# Patient Record
Sex: MALE | Race: WHITE | Employment: UNEMPLOYED | RURAL
[De-identification: names, ages, dates, MRNs, and addresses within clinical notes are randomized per-mention and may not be internally consistent; named-entity substitution may affect disease eponyms.]

---

## 2017-11-20 ENCOUNTER — HOSPITAL ENCOUNTER (OUTPATIENT)
Dept: OTHER | Age: 22
Discharge: HOME | End: 2017-11-20
Attending: FAMILY MEDICINE | Admitting: FAMILY MEDICINE

## 2017-11-20 ENCOUNTER — OFFICE VISIT (OUTPATIENT)
Dept: FAMILY MEDICINE CLINIC | Age: 22
End: 2017-11-20
Payer: COMMERCIAL

## 2017-11-20 VITALS
BODY MASS INDEX: 31.22 KG/M2 | HEART RATE: 109 BPM | RESPIRATION RATE: 18 BRPM | SYSTOLIC BLOOD PRESSURE: 118 MMHG | HEIGHT: 68 IN | OXYGEN SATURATION: 98 % | DIASTOLIC BLOOD PRESSURE: 78 MMHG | WEIGHT: 206 LBS

## 2017-11-20 DIAGNOSIS — R53.83 FATIGUE, UNSPECIFIED TYPE: ICD-10-CM

## 2017-11-20 DIAGNOSIS — J01.00 ACUTE NON-RECURRENT MAXILLARY SINUSITIS: Primary | ICD-10-CM

## 2017-11-20 DIAGNOSIS — M25.50 ARTHRALGIA, UNSPECIFIED JOINT: ICD-10-CM

## 2017-11-20 DIAGNOSIS — M54.50 CHRONIC LEFT-SIDED LOW BACK PAIN WITHOUT SCIATICA: ICD-10-CM

## 2017-11-20 DIAGNOSIS — G89.29 CHRONIC LEFT-SIDED LOW BACK PAIN WITHOUT SCIATICA: ICD-10-CM

## 2017-11-20 PROCEDURE — 99203 OFFICE O/P NEW LOW 30 MIN: CPT | Performed by: FAMILY MEDICINE

## 2017-11-20 RX ORDER — AZITHROMYCIN 250 MG/1
TABLET, FILM COATED ORAL
Qty: 1 PACKET | Refills: 0 | Status: SHIPPED | OUTPATIENT
Start: 2017-11-20 | End: 2017-11-30

## 2017-11-20 ASSESSMENT — PATIENT HEALTH QUESTIONNAIRE - PHQ9
SUM OF ALL RESPONSES TO PHQ9 QUESTIONS 1 & 2: 0
1. LITTLE INTEREST OR PLEASURE IN DOING THINGS: 0
2. FEELING DOWN, DEPRESSED OR HOPELESS: 0
SUM OF ALL RESPONSES TO PHQ QUESTIONS 1-9: 0

## 2017-11-20 NOTE — PROGRESS NOTES
SUBJECTIVE:    Patient ID: Misty Welsh is a 25 y.o. male. Chief Complaint   Patient presents with    Back Pain     est care    Sinusitis       HPI: He's here today complaining of some significant back pain. He's had a lot more discomfort in his neck back and lower back. He says he has a lot of joint popping and tenderness. He has some swelling. He says it's in his knees his hips his ankles his hands. He says it seems to be getting worse. He says he is not really know what it was. But it is progressing. Weather does make it a little worse. He says he's had some family history of regular arthritis. He is complaining of some sinus symptoms. He is having some congestion. He's had some cough. He's had some thick sputum at times. He says he is somewhat more fatigued than his usual. He denies any significant diet changes. Review of Systems   Constitutional: Positive for fatigue. Musculoskeletal: Positive for arthralgias, back pain, gait problem, joint swelling, myalgias, neck pain and neck stiffness. All other systems reviewed and are negative. OBJECTIVE:  Wt Readings from Last 3 Encounters:   11/20/17 206 lb (93.4 kg)     BP Readings from Last 3 Encounters:   11/20/17 118/78      Pulse Readings from Last 3 Encounters:   11/20/17 109     Body mass index is 31.32 kg/m². Resp Readings from Last 3 Encounters:   11/20/17 18     Physical Exam   Constitutional: He is oriented to person, place, and time. He appears well-developed and well-nourished. HENT:   Head: Normocephalic and atraumatic. Right Ear: Hearing, tympanic membrane, external ear and ear canal normal.   Left Ear: Hearing, tympanic membrane, external ear and ear canal normal.   Nose: Rhinorrhea present. Right sinus exhibits maxillary sinus tenderness. Left sinus exhibits maxillary sinus tenderness. Mouth/Throat: Posterior oropharyngeal erythema present. Eyes: EOM are normal. Pupils are equal, round, and reactive to light.    Neck: Normal [facility]      Lab Results   Component Value Date    WBC 10.2 11/20/2017    NEUTROABS 7.6 11/20/2017    HGB 14.9 11/20/2017    HCT 42.5 11/20/2017    MCV 84.0 11/20/2017     11/20/2017       Lab Results   Component Value Date    TSH 3.35 11/20/2017         ASSESSMENT:   1. Chronic left-sided low back pain without sciatica  XR Spine Scoliosis Standing (Thoracolumbar)    External Referral To Physical Therapy   2. Arthralgia, unspecified joint  SEDIMENTATION RATE    CARRIE    RHEUMATOID FACTOR   3. Fatigue, unspecified type  COMPREHENSIVE METABOLIC PANEL    CBC WITH AUTO DIFFERENTIAL    TSH without Reflex   4. Acute non-recurrent maxillary sinusitis          PLAN:  Orders Placed This Encounter   Medications    azithromycin (ZITHROMAX) 250 MG tablet     Sig: Take 2 tabs (500 mg) on Day 1, and take 1 tab (250 mg) on days 2 through 5. Dispense:  1 packet     Refill:  0        There are no discontinued medications.     Controlled Substances Monitoring:

## 2017-11-21 DIAGNOSIS — M25.50 ARTHRALGIA, UNSPECIFIED JOINT: ICD-10-CM

## 2017-11-21 DIAGNOSIS — R53.83 FATIGUE, UNSPECIFIED TYPE: ICD-10-CM

## 2017-11-21 LAB
A/G RATIO: 1.5 (ref 0.8–2)
ALBUMIN SERPL-MCNC: 4.9 G/DL (ref 3.4–4.8)
ALP BLD-CCNC: 88 U/L (ref 25–100)
ALT SERPL-CCNC: 101 U/L (ref 4–36)
ANION GAP SERPL CALCULATED.3IONS-SCNC: 13 MMOL/L (ref 3–16)
AST SERPL-CCNC: 36 U/L (ref 8–33)
BASOPHILS ABSOLUTE: 0 K/UL (ref 0–0.1)
BASOPHILS RELATIVE PERCENT: 0.2 %
BILIRUB SERPL-MCNC: 0.4 MG/DL (ref 0.3–1.2)
BUN BLDV-MCNC: 12 MG/DL (ref 6–20)
CALCIUM SERPL-MCNC: 10.3 MG/DL (ref 8.5–10.5)
CHLORIDE BLD-SCNC: 95 MMOL/L (ref 98–107)
CO2: 30 MMOL/L (ref 20–30)
CREAT SERPL-MCNC: 1 MG/DL (ref 0.4–1.2)
EOSINOPHILS ABSOLUTE: 0.1 K/UL (ref 0–0.4)
EOSINOPHILS RELATIVE PERCENT: 1 %
GFR AFRICAN AMERICAN: >59
GFR NON-AFRICAN AMERICAN: >59
GLOBULIN: 3.3 G/DL
GLUCOSE BLD-MCNC: 105 MG/DL (ref 74–106)
HCT VFR BLD CALC: 42.5 % (ref 40–54)
HEMOGLOBIN: 14.9 G/DL (ref 13–18)
LYMPHOCYTES ABSOLUTE: 1.5 K/UL (ref 1.5–4)
LYMPHOCYTES RELATIVE PERCENT: 14.8 % (ref 20–40)
MCH RBC QN AUTO: 29.4 PG (ref 27–32)
MCHC RBC AUTO-ENTMCNC: 35.1 G/DL (ref 31–35)
MCV RBC AUTO: 84 FL (ref 80–100)
MONOCYTES ABSOLUTE: 1 K/UL (ref 0.2–0.8)
MONOCYTES RELATIVE PERCENT: 9.3 % (ref 3–10)
NEUTROPHILS ABSOLUTE: 7.6 K/UL (ref 2–7.5)
NEUTROPHILS RELATIVE PERCENT: 74.7 %
PDW BLD-RTO: 12 % (ref 11–16)
PLATELET # BLD: 321 K/UL (ref 150–400)
PMV BLD AUTO: 10.5 FL (ref 6–10)
POTASSIUM SERPL-SCNC: 4.5 MMOL/L (ref 3.4–5.1)
RBC # BLD: 5.06 M/UL (ref 4.5–6)
RHEUMATOID FACTOR: <10 IU/ML
SEDIMENTATION RATE, ERYTHROCYTE: 5 MM/HR (ref 0–10)
SODIUM BLD-SCNC: 138 MMOL/L (ref 136–145)
TOTAL PROTEIN: 8.2 G/DL (ref 6.4–8.3)
TSH SERPL DL<=0.05 MIU/L-ACNC: 3.35 UIU/ML (ref 0.35–5.5)
WBC # BLD: 10.2 K/UL (ref 4–11)

## 2017-11-22 LAB
ANA INTERPRETATION: NORMAL
ANTI-NUCLEAR ANTIBODY (ANA): NEGATIVE

## 2017-12-03 ASSESSMENT — ENCOUNTER SYMPTOMS: BACK PAIN: 1
